# Patient Record
Sex: MALE | Race: WHITE
[De-identification: names, ages, dates, MRNs, and addresses within clinical notes are randomized per-mention and may not be internally consistent; named-entity substitution may affect disease eponyms.]

---

## 2019-11-13 ENCOUNTER — HOSPITAL ENCOUNTER (OUTPATIENT)
Dept: HOSPITAL 95 - ORSCSDS | Age: 64
Discharge: HOME | End: 2019-11-13
Attending: INTERNAL MEDICINE
Payer: MEDICARE

## 2019-11-13 VITALS — WEIGHT: 191.58 LBS | BODY MASS INDEX: 25.39 KG/M2 | HEIGHT: 72.99 IN

## 2019-11-13 DIAGNOSIS — D12.2: ICD-10-CM

## 2019-11-13 DIAGNOSIS — Z12.11: Primary | ICD-10-CM

## 2019-11-13 DIAGNOSIS — Z87.891: ICD-10-CM

## 2019-11-13 DIAGNOSIS — D12.0: ICD-10-CM

## 2019-11-13 DIAGNOSIS — Z79.899: ICD-10-CM

## 2019-11-13 DIAGNOSIS — Z79.82: ICD-10-CM

## 2019-11-13 DIAGNOSIS — I10: ICD-10-CM

## 2019-11-13 DIAGNOSIS — K57.30: ICD-10-CM

## 2019-11-13 PROCEDURE — 0DBH8ZX EXCISION OF CECUM, VIA NATURAL OR ARTIFICIAL OPENING ENDOSCOPIC, DIAGNOSTIC: ICD-10-PCS | Performed by: INTERNAL MEDICINE

## 2019-11-13 PROCEDURE — 0DBK8ZX EXCISION OF ASCENDING COLON, VIA NATURAL OR ARTIFICIAL OPENING ENDOSCOPIC, DIAGNOSTIC: ICD-10-PCS | Performed by: INTERNAL MEDICINE

## 2019-11-13 NOTE — NUR
11/13/19 1411 Stefany Okeefe
PT. INSTRUCTED THAT WHEN SUCTIONING HIM, HIS LOWER LEFT LIP BLED A
LITTLE BIT. PT. VERBALIZES IT WASN'T BOTHERING HIM, IT WAS FINE.

## 2019-11-13 NOTE — NUR
11/13/19 1324 Stefany Okeefe S
PT. REFUSED ANYTHING TO DRINK. ALSO WHEN ASKED PT. IF HE HAD ANY PAIN,
PT. VERBALIZED THAT HIS "EVERYWHERE" PAIN WAS BETTER. PT. DIDN'T HAVE
ANY ABD. PAIN.

## 2023-05-23 ENCOUNTER — HOSPITAL ENCOUNTER (EMERGENCY)
Dept: HOSPITAL 95 - ER | Age: 68
Discharge: HOME | End: 2023-05-23
Payer: MEDICARE

## 2023-05-23 VITALS — WEIGHT: 220 LBS | BODY MASS INDEX: 29.16 KG/M2 | HEIGHT: 73 IN

## 2023-05-23 VITALS — DIASTOLIC BLOOD PRESSURE: 84 MMHG | SYSTOLIC BLOOD PRESSURE: 130 MMHG

## 2023-05-23 DIAGNOSIS — Z79.82: ICD-10-CM

## 2023-05-23 DIAGNOSIS — Z79.899: ICD-10-CM

## 2023-05-23 DIAGNOSIS — R05.9: ICD-10-CM

## 2023-05-23 DIAGNOSIS — R51.9: Primary | ICD-10-CM
